# Patient Record
Sex: FEMALE | Race: ASIAN | NOT HISPANIC OR LATINO | URBAN - METROPOLITAN AREA
[De-identification: names, ages, dates, MRNs, and addresses within clinical notes are randomized per-mention and may not be internally consistent; named-entity substitution may affect disease eponyms.]

---

## 2020-12-21 ENCOUNTER — EMERGENCY (EMERGENCY)
Facility: HOSPITAL | Age: 65
LOS: 1 days | Discharge: HOME | End: 2020-12-21
Attending: EMERGENCY MEDICINE | Admitting: EMERGENCY MEDICINE
Payer: COMMERCIAL

## 2020-12-21 VITALS
SYSTOLIC BLOOD PRESSURE: 150 MMHG | RESPIRATION RATE: 18 BRPM | HEART RATE: 88 BPM | TEMPERATURE: 98 F | DIASTOLIC BLOOD PRESSURE: 78 MMHG | OXYGEN SATURATION: 99 %

## 2020-12-21 VITALS
OXYGEN SATURATION: 100 % | TEMPERATURE: 98 F | SYSTOLIC BLOOD PRESSURE: 137 MMHG | RESPIRATION RATE: 16 BRPM | HEART RATE: 63 BPM | DIASTOLIC BLOOD PRESSURE: 77 MMHG

## 2020-12-21 LAB
ALBUMIN SERPL ELPH-MCNC: 4.3 G/DL — SIGNIFICANT CHANGE UP (ref 3.3–5)
ALP SERPL-CCNC: 82 U/L — SIGNIFICANT CHANGE UP (ref 40–120)
ALT FLD-CCNC: 35 U/L — HIGH (ref 4–33)
ANION GAP SERPL CALC-SCNC: 9 MMOL/L — SIGNIFICANT CHANGE UP (ref 7–14)
APTT BLD: 33.4 SEC — SIGNIFICANT CHANGE UP (ref 27–36.3)
AST SERPL-CCNC: 29 U/L — SIGNIFICANT CHANGE UP (ref 4–32)
BASOPHILS # BLD AUTO: 0.04 K/UL — SIGNIFICANT CHANGE UP (ref 0–0.2)
BASOPHILS NFR BLD AUTO: 0.5 % — SIGNIFICANT CHANGE UP (ref 0–2)
BILIRUB SERPL-MCNC: 0.2 MG/DL — SIGNIFICANT CHANGE UP (ref 0.2–1.2)
BUN SERPL-MCNC: 14 MG/DL — SIGNIFICANT CHANGE UP (ref 7–23)
CALCIUM SERPL-MCNC: 10.1 MG/DL — SIGNIFICANT CHANGE UP (ref 8.4–10.5)
CHLORIDE SERPL-SCNC: 104 MMOL/L — SIGNIFICANT CHANGE UP (ref 98–107)
CO2 SERPL-SCNC: 29 MMOL/L — SIGNIFICANT CHANGE UP (ref 22–31)
CREAT SERPL-MCNC: 0.74 MG/DL — SIGNIFICANT CHANGE UP (ref 0.5–1.3)
EOSINOPHIL # BLD AUTO: 0.11 K/UL — SIGNIFICANT CHANGE UP (ref 0–0.5)
EOSINOPHIL NFR BLD AUTO: 1.4 % — SIGNIFICANT CHANGE UP (ref 0–6)
GLUCOSE SERPL-MCNC: 91 MG/DL — SIGNIFICANT CHANGE UP (ref 70–99)
HCT VFR BLD CALC: 41.7 % — SIGNIFICANT CHANGE UP (ref 34.5–45)
HGB BLD-MCNC: 12.2 G/DL — SIGNIFICANT CHANGE UP (ref 11.5–15.5)
IANC: 4.75 K/UL — SIGNIFICANT CHANGE UP (ref 1.5–8.5)
IMM GRANULOCYTES NFR BLD AUTO: 0.4 % — SIGNIFICANT CHANGE UP (ref 0–1.5)
INR BLD: 1.05 RATIO — SIGNIFICANT CHANGE UP (ref 0.88–1.17)
LYMPHOCYTES # BLD AUTO: 2.32 K/UL — SIGNIFICANT CHANGE UP (ref 1–3.3)
LYMPHOCYTES # BLD AUTO: 30.2 % — SIGNIFICANT CHANGE UP (ref 13–44)
MCHC RBC-ENTMCNC: 21.5 PG — LOW (ref 27–34)
MCHC RBC-ENTMCNC: 29.3 GM/DL — LOW (ref 32–36)
MCV RBC AUTO: 73.4 FL — LOW (ref 80–100)
MONOCYTES # BLD AUTO: 0.44 K/UL — SIGNIFICANT CHANGE UP (ref 0–0.9)
MONOCYTES NFR BLD AUTO: 5.7 % — SIGNIFICANT CHANGE UP (ref 2–14)
NEUTROPHILS # BLD AUTO: 4.75 K/UL — SIGNIFICANT CHANGE UP (ref 1.8–7.4)
NEUTROPHILS NFR BLD AUTO: 61.8 % — SIGNIFICANT CHANGE UP (ref 43–77)
NRBC # BLD: 0 /100 WBCS — SIGNIFICANT CHANGE UP
NRBC # FLD: 0 K/UL — SIGNIFICANT CHANGE UP
PLATELET # BLD AUTO: 310 K/UL — SIGNIFICANT CHANGE UP (ref 150–400)
POTASSIUM SERPL-MCNC: 4 MMOL/L — SIGNIFICANT CHANGE UP (ref 3.5–5.3)
POTASSIUM SERPL-SCNC: 4 MMOL/L — SIGNIFICANT CHANGE UP (ref 3.5–5.3)
PROT SERPL-MCNC: 8.3 G/DL — SIGNIFICANT CHANGE UP (ref 6–8.3)
PROTHROM AB SERPL-ACNC: 11.9 SEC — SIGNIFICANT CHANGE UP (ref 9.8–13.1)
RBC # BLD: 5.68 M/UL — HIGH (ref 3.8–5.2)
RBC # FLD: 15.7 % — HIGH (ref 10.3–14.5)
SODIUM SERPL-SCNC: 142 MMOL/L — SIGNIFICANT CHANGE UP (ref 135–145)
TROPONIN T, HIGH SENSITIVITY RESULT: 10 NG/L — SIGNIFICANT CHANGE UP
TROPONIN T, HIGH SENSITIVITY RESULT: 12 NG/L — SIGNIFICANT CHANGE UP
WBC # BLD: 7.69 K/UL — SIGNIFICANT CHANGE UP (ref 3.8–10.5)
WBC # FLD AUTO: 7.69 K/UL — SIGNIFICANT CHANGE UP (ref 3.8–10.5)

## 2020-12-21 PROCEDURE — 70498 CT ANGIOGRAPHY NECK: CPT | Mod: 26

## 2020-12-21 PROCEDURE — 99284 EMERGENCY DEPT VISIT MOD MDM: CPT

## 2020-12-21 PROCEDURE — 70496 CT ANGIOGRAPHY HEAD: CPT | Mod: 26

## 2020-12-21 RX ORDER — MECLIZINE HCL 12.5 MG
25 TABLET ORAL ONCE
Refills: 0 | Status: COMPLETED | OUTPATIENT
Start: 2020-12-21 | End: 2020-12-21

## 2020-12-21 RX ORDER — METOCLOPRAMIDE HCL 10 MG
10 TABLET ORAL ONCE
Refills: 0 | Status: COMPLETED | OUTPATIENT
Start: 2020-12-21 | End: 2020-12-21

## 2020-12-21 RX ADMIN — Medication 25 MILLIGRAM(S): at 14:18

## 2020-12-21 RX ADMIN — Medication 10 MILLIGRAM(S): at 14:18

## 2020-12-21 NOTE — ED PROVIDER NOTE - ATTENDING CONTRIBUTION TO CARE
65F h/o htn, woke up this morning with sense of imbalance (drifting to left side), fell x 2.  called as code stroke, cleared by neuro, also possibly presyncopal, will check cardiacs, telemetry while in the department, will refer to outpt cards/ neuro.   GEN - NAD; well appearing; A+O x3   HEAD - NC/AT     EYES - EOMI, no conjunctival pallor, no scleral icterus  ENT -   mucous membranes  moist , no discharge      NECK - Neck supple  PULM - CTA b/l,  symmetric breath sounds  COR -  RRR, S1 S2, no murmurs  ABD - , ND, NT, soft, no guarding, no rebound, no masses    BACK - no CVA tenderness, nontender spine     EXTREMS - no edema, no deformity, warm and well perfused    SKIN - no rash or bruising      NEUROLOGIC -  A&Ox3, PERRLA, EOMI, no nystagmus, CN2-12 grossly intact, no pronator drift, normal finger to nose and rapid alternating finger movements, normal sensation and 5/5 strength in all extremities

## 2020-12-21 NOTE — ED PROVIDER NOTE - NSFOLLOWUPINSTRUCTIONS_ED_ALL_ED_FT
Near Syncope    WHAT YOU NEED TO KNOW:    Near syncope, also called presyncope, is the feeling that you may faint (lose consciousness), but you do not. You can control some health conditions that cause near syncope. Your healthcare providers can help you create a plan to manage near syncope and prevent episodes.    DISCHARGE INSTRUCTIONS:    Return to the emergency department if:    You have sudden chest pain.    You have trouble breathing or shortness of breath.    You have vision changes, are sweating, and have nausea while you are sitting or lying down.    You feel dizzy or flushed and your heart is fluttering.    You lose consciousness.    You cannot use your arm, hand, foot, or leg, or it feels weak.    You have trouble speaking or understanding others when they speak.  Contact your healthcare provider if:    You have new or worsening symptoms.    Your heart beats faster or slower than usual.    You have questions or concerns about your condition or care.  Medicines:    Medicines may be needed to help your heart pump strongly and regularly. Your healthcare provider may also make changes to any medicines that are causing syncope.    Take your medicine as directed. Contact your healthcare provider if you think your medicine is not helping or if you have side effects. Tell him or her if you are allergic to any medicine. Keep a list of the medicines, vitamins, and herbs you take. Include the amounts, and when and why you take them. Bring the list or the pill bottles to follow-up visits. Carry your medicine list with you in case of an emergency.  Follow up with your healthcare provider as directed: You may need more tests to help find the cause of your near syncope episodes. The tests will help healthcare providers plan the best treatment for you. Write down your questions so you remember to ask them during your visits.    Manage near syncope:    Sit or lie down when needed. This includes when you feel dizzy, your throat is getting tight, and your vision changes. Raise your legs above the level of your heart.    Take slow, deep breaths if you start to breathe faster with anxiety or fear. This can help decrease dizziness and the feeling that you might faint.    Keep a record of your near syncope episodes. Include your symptoms and your activity before and after the episode. The record can help your healthcare provider find the cause of your near syncope and help you manage episodes.  Prevent a near syncope episode:    Move slowly and let yourself get used to one position before you move to another position. This is very important when you change from a lying or sitting position to a standing position. Take some deep breaths before you stand up from a lying position. Stand up slowly. Sudden movements may cause a fainting spell. Sit on the side of the bed or couch for a few minutes before you stand up. If you are on bedrest, try to be upright for about 2 hours each day, or as directed. Do not lock your legs if you are standing for a long period of time. Move your legs and bend your knees to keep blood flowing.    Follow your healthcare provider's recommendations. Your provider may recommend that you drink more liquids to prevent dehydration. You may also need to have more salt to keep your blood pressure from dropping too low and causing syncope. Your provider will tell you how much liquid and sodium to have each day. He or she will also tell you how much physical activity is safe for you. This will depend on what is causing your near syncope.    Watch for signs of low blood sugar. These include hunger, nervousness, sweating, and fast or fluttery heartbeats. Talk with your healthcare provider about ways to keep your blood sugar level steady.    Check your blood pressure often. This is important if you take medicine to lower your blood pressure. Check your blood pressure when you are lying down and when you are standing. Ask how often to check during the day. Keep a record of your blood pressure numbers. Your healthcare provider may use the record to help plan your treatment.  How to take a Blood Pressure      Do not strain if you are constipated. You may faint if you strain to have a bowel movement. Walking is the best way to get your bowels moving. Eat foods high in fiber to make it easier to have a bowel movement. Good examples are high-fiber cereals, beans, vegetables, and whole-grain breads. Prune juice may help make bowel movements softer.    Do not exercise outside on a hot day. The combination of physical activity and heat can lead to dehydration. This can cause syncope.

## 2020-12-21 NOTE — CONSULT NOTE ADULT - SUBJECTIVE AND OBJECTIVE BOX
SHARI [id number] is a 65y old  Female who presents with a chief complaint of     HPI: 64 yo F with PMH of HTN presents to the ED with acute onset dizziness. LKW last night. She reports that this morning, when she woke up at 5:30 am, she woke up with dizziness and sensation of falling down. Also with a severe headache. No diplopia, dysarthria, numbness, weakness.           MEDICATIONS  (STANDING):    MEDICATIONS  (PRN):    PAST MEDICAL & SURGICAL HISTORY:    FAMILY HISTORY:    Allergies    No Known Allergies    Intolerances        SHx - No smoking, No ETOH, No drug abuse      Review of Systems:    as above      Vital Signs Last 24 Hrs  T(C): --  T(F): --  HR: --  BP: --  BP(mean): --  RR: --  SpO2: --    Neurological Exam:  Mental Status: Orientated to self, date and place.  Attention intact.  No dysarthria. Speech fluent.  Cranial Nerves:  EOMI, VFF, no nystagmus.  No facial asymmetry.    Strength:   no drift in all 4 extremities             Dysmetria: None to finger-nose-finger or heel-shin-heel  Tremor: tremulous b/l, worse with arm extension  Sensation: intact to light touch. no extinction  Gait: normal. SAHRI [id number] is a 65y old  Female who presents with a chief complaint of     HPI: 66 yo F with PMH of HTN presents to the ED with acute onset dizziness. LKW last night. She reports that this morning, when she woke up at 5:30 am, she woke up with dizziness and sensation of falling down. Denies room spinning. Also with a severe headache. No diplopia, dysarthria, numbness, weakness.     MEDICATIONS  (STANDING):    MEDICATIONS  (PRN):    PAST MEDICAL & SURGICAL HISTORY:    FAMILY HISTORY:    Allergies    No Known Allergies    Intolerances        SHx - No smoking, No ETOH, No drug abuse      Review of Systems:    as above      Vital Signs Last 24 Hrs  T(C): --  T(F): --  HR: --  BP: --  BP(mean): --  RR: --  SpO2: --    Neurological Exam:  Mental Status: Orientated to self, date and place.  Attention intact.  No dysarthria. Speech fluent.  Cranial Nerves:  EOMI, VFF, no nystagmus.  No facial asymmetry.    Strength:   no drift in all 4 extremities             Dysmetria: None to finger-nose-finger or heel-shin-heel  Tremor: tremulous b/l, worse with arm extension  Sensation: intact to light touch. no extinction  Gait: normal.    nihss 0  mrs 0 SHARI [id number] is a 65y old  Female who presents with a chief complaint of     HPI: 66 yo F with PMH of HTN presents to the ED with acute onset dizziness. LKW last night. She reports that this morning, when she woke up at 5:30 am, she woke up with dizziness and sensation of falling down. Endorses feeling lightheadedness, worse when getting out of bed. Denies room spinning. Also with a severe right sided headache. No diplopia, dysarthria, numbness, weakness. She reports that she had a similar episode earlier this year.    MEDICATIONS  (STANDING):    MEDICATIONS  (PRN):    PAST MEDICAL & SURGICAL HISTORY:    FAMILY HISTORY:    Allergies    No Known Allergies    Intolerances        SHx - No smoking, No ETOH, No drug abuse      Review of Systems:    as above      Vital Signs Last 24 Hrs  T(C): --  T(F): --  HR: --  BP: --  BP(mean): --  RR: --  SpO2: --    Neurological Exam:  Mental Status: Orientated to self, date and place.  Attention intact.  No dysarthria. Speech fluent.  Cranial Nerves:  EOMI, VFF, no nystagmus.  No facial asymmetry.    Strength:   no drift in all 4 extremities             Dysmetria: None to finger-nose-finger or heel-shin-heel  Tremor: tremulous b/l, worse with arm extension  Sensation: intact to light touch. no extinction  Gait: normal.    nihss 0  mrs 0 SHARI [id number] is a 65y old  Female who presents with a chief complaint of     HPI: 64 yo F with PMH of HTN presents to the ED with acute onset dizziness. LKW last night. She reports that this morning, when she woke up at 5:30 am, she woke up with dizziness and sensation of falling down. Endorses feeling lightheadedness, worse when getting out of bed. Denies room spinning. Also with a severe right sided headache. No diplopia, dysarthria, numbness, weakness. She reports that she had a similar episode earlier this year.    MEDICATIONS  (STANDING):    MEDICATIONS  (PRN):    PAST MEDICAL & SURGICAL HISTORY:    FAMILY HISTORY:    Allergies    No Known Allergies    Intolerances        SHx - No smoking, No ETOH, No drug abuse      Review of Systems:    as above      Vital Signs Last 24 Hrs  T(C): --  T(F): --  HR: --  BP: --  BP(mean): --  RR: --  SpO2: --    Neurological Exam:  Mental Status: Orientated to self, date and place.  Attention intact.  No dysarthria. Speech fluent.  Cranial Nerves:  EOMI, VFF, no nystagmus.  No facial asymmetry.    Strength:   no drift in all 4 extremities             Dysmetria: None to finger-nose-finger or heel-shin-heel  Tremor: tremulous b/l, worse with arm extension  Sensation: intact to light touch. no extinction  Gait: normal.    no corrective saccade on head impulse test  no skew deviation    nihss 0  mrs 0 SHARI [id number] is a 65y old  Female who presents with a chief complaint of     HPI: 66 yo F with PMH of HTN, HLD presents to the ED with acute onset dizziness. LKW last night. She reports that this morning, when she woke up at 5:30 am, she woke up with dizziness and sensation of falling down. Endorses feeling lightheadedness, worse when getting out of bed. Denies room spinning. Also with a severe right sided headache. No diplopia, dysarthria, numbness, weakness. She reports that she had a similar episode earlier this year.    MEDICATIONS  (STANDING):    MEDICATIONS  (PRN):    PAST MEDICAL & SURGICAL HISTORY:    FAMILY HISTORY:    Allergies    No Known Allergies    Intolerances        SHx - No smoking, No ETOH, No drug abuse      Review of Systems:    as above      Vital Signs Last 24 Hrs  T(C): --  T(F): --  HR: --  BP: --  BP(mean): --  RR: --  SpO2: --    Neurological Exam:  Mental Status: Orientated to self, date and place.  Attention intact.  No dysarthria. Speech fluent.  Cranial Nerves:  EOMI, VFF, no nystagmus.  No facial asymmetry.    Strength:   no drift in all 4 extremities             Dysmetria: None to finger-nose-finger or heel-shin-heel  Tremor: tremulous b/l, worse with arm extension  Sensation: intact to light touch. no extinction  Gait: normal.    no corrective saccade on head impulse test  no skew deviation    nihss 0  mrs 0

## 2020-12-21 NOTE — ED PROVIDER NOTE - RAPID ASSESSMENT
Enoch:  called to triage to assess for possible stroke.  65F h/o htn, woke up this morning with sense of imbalance (drifting to left side), fell x 2.  Stroke code called, pt endorsed to neurology and Dr. Torres.

## 2020-12-21 NOTE — CONSULT NOTE ADULT - ASSESSMENT
64 yo F with PMH of HTN presents to the ED with acute onset dizziness. Neurological exam does not reveal any focal neurological deficits. CT head reveals -. CTA h/n reveals -.    Impression:     Plan: 64 yo F with PMH of HTN presents to the ED with acute onset dizziness. Neurological exam does not reveal any focal neurological deficits. CT head reveals no acute lesion. CTA h/n reveals -.    Impression: This is most likely pre-syncope or cardiac in origin. Less likely this is vertigo, and less likely symptoms could be explained by central etiology (ie stroke)    Plan:  -outpatient neurology follow up  -TTE and cardiology evaluation  -orthostatic BPs 66 yo F with PMH of HTN presents to the ED with acute onset dizziness. Neurological exam does not reveal any focal neurological deficits. CT head reveals no acute lesion. CTA h/n reveals no LVO or stenosis.    Impression: This is most likely pre-syncope or cardiac in origin. Less likely this is vertigo, and less likely symptoms could be explained by central etiology (ie stroke)    Plan:  -outpatient neurology follow up  -TTE and cardiology evaluation  -orthostatic BPs 64 yo F with PMH of HTN presents to the ED with acute onset dizziness. Neurological exam does not reveal any focal neurological deficits. CT head reveals no acute lesion. CTA h/n reveals no LVO or stenosis.    Impression: This is most likely pre-syncope or cardiac in origin. Less likely this is vertigo, and less likely symptoms could be explained by central etiology (ie stroke).    Plan:  -outpatient neurology follow up  -TTE and cardiology evaluation  -orthostatic BPs 64 yo F with PMH of HTN presents to the ED with acute onset dizziness. Neurological exam does not reveal any focal neurological deficits. CT head reveals no acute lesion. CTA h/n reveals no LVO or stenosis.    Impression: This is most likely pre-syncope or cardiac in origin. Less likely this is vertigo, and less likely symptoms could be explained by central etiology (ie stroke).    Plan:  -outpatient neurology follow up  -TTE and cardiology evaluation  -orthostatic BPs    case d/w Dr. Sy De La O, stroke fellow

## 2020-12-21 NOTE — ED ADULT TRIAGE NOTE - CHIEF COMPLAINT QUOTE
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 Pts symptoms began when she got up to go to work--pt fell on the way--poor coordination  Pt states the only other symptom is headache and dizziness  Dr Kendall evaluated and code stroke called

## 2020-12-21 NOTE — ED PROVIDER NOTE - PATIENT PORTAL LINK FT
You can access the FollowMyHealth Patient Portal offered by [facility] by registering at the following website: [email or web address]. By joining [employer]’s FollowMyHealth portal, you will also be able to view your health information using other applications (apps) compatible with our system.

## 2020-12-21 NOTE — ED ADULT NURSE NOTE - OBJECTIVE STATEMENT
pt is A&Ox3, ambulatory, able to make needs known and presents with C/O lightheadedness with HA and dizziness plus a feeling like "I'm pulling to the left when walking" since almost falling twice this morning at her bedside at 5am. PT has no neuro or muscular deficits/weakness. Denies any N/V/D or fever and chest pain

## 2020-12-21 NOTE — ED PROVIDER NOTE - NS ED ROS FT
CONSTITUTIONAL: No fever,  EYES: No redness  ENT: no sore throat  CARDIOVASCULAR: No chest pain,  RESPIRATORY: No cough, no shortness of breath  GI: No abdominal pain, no nausea, no vomiting,  GENITOURINARY: No dysuria  MUSKULOSKELETAL: No new pain in joints/muscles  SKIN: No rash  NEURO: ++++ headache  ALL OTHER SYSTEMS NEGATIVE.

## 2020-12-21 NOTE — ED PROVIDER NOTE - PHYSICAL EXAMINATION
CONSTITUTIONAL: Non-toxic, non-diaphoretic, in no apparent distress  HEAD: Normocephalic; atruamatic  EYES: EOM intact   ENMT: External appears normal; normal oropharynx, moist  NECK: grossly normal active ROM,  CARD: No cyanosis, good peripheral perfusion, RRR  RESP: Normal chest excursion with respiration; no increased work of breathing  ABD: non-distended   EXT: moving all extremities, no gross disfigurement or asymmetry,  SKIN: Warm, dry, no rash  NEURO:  moving all extremities, no facial droop, no dysarthria      cn2-12 intact   5/5 in rue, lue, rle, lle  no sensory deficit, no dysmetria

## 2020-12-21 NOTE — ED PROVIDER NOTE - CLINICAL SUMMARY MEDICAL DECISION MAKING FREE TEXT BOX
cleared by neuro, trending troponin but no cardiac symptoms, can likely complete workup as an outpatient, of note was out of window for t-PA and nihss too low cleared by neuro, trending troponin but no cardiac symptoms, can likely complete workup as an outpatient, of note was out of window for t-PA.

## 2020-12-21 NOTE — ED PROVIDER NOTE - OBJECTIVE STATEMENT
65 y f pmh htn, dm  pw lightheadedness and severe10/10 headache  started this morning at 0530, it woke her up  last normal was when she went to sleep  no room spinning sensation  no chest pain, or other symptoms  says the left side of her upper harm has some paraethesias

## 2021-03-04 ENCOUNTER — EMERGENCY (EMERGENCY)
Facility: HOSPITAL | Age: 66
LOS: 1 days | Discharge: HOME | End: 2021-03-04
Admitting: EMERGENCY MEDICINE
Payer: COMMERCIAL

## 2021-03-04 VITALS
HEART RATE: 77 BPM | OXYGEN SATURATION: 100 % | TEMPERATURE: 98 F | SYSTOLIC BLOOD PRESSURE: 122 MMHG | RESPIRATION RATE: 16 BRPM | DIASTOLIC BLOOD PRESSURE: 66 MMHG

## 2021-03-04 VITALS
OXYGEN SATURATION: 100 % | RESPIRATION RATE: 18 BRPM | DIASTOLIC BLOOD PRESSURE: 77 MMHG | SYSTOLIC BLOOD PRESSURE: 171 MMHG | HEART RATE: 72 BPM | TEMPERATURE: 98 F

## 2021-03-04 PROCEDURE — 99284 EMERGENCY DEPT VISIT MOD MDM: CPT

## 2021-03-04 PROCEDURE — 73030 X-RAY EXAM OF SHOULDER: CPT | Mod: 26,RT

## 2021-03-04 PROCEDURE — 73060 X-RAY EXAM OF HUMERUS: CPT | Mod: 26,RT

## 2021-03-04 PROCEDURE — 73080 X-RAY EXAM OF ELBOW: CPT | Mod: 26,RT

## 2021-03-04 RX ORDER — DIAZEPAM 5 MG
5 TABLET ORAL ONCE
Refills: 0 | Status: DISCONTINUED | OUTPATIENT
Start: 2021-03-04 | End: 2021-03-04

## 2021-03-04 RX ORDER — ACETAMINOPHEN 500 MG
975 TABLET ORAL ONCE
Refills: 0 | Status: COMPLETED | OUTPATIENT
Start: 2021-03-04 | End: 2021-03-04

## 2021-03-04 RX ADMIN — Medication 5 MILLIGRAM(S): at 22:48

## 2021-03-04 RX ADMIN — Medication 50 MILLIGRAM(S): at 22:48

## 2021-03-04 RX ADMIN — Medication 975 MILLIGRAM(S): at 22:48

## 2021-03-04 NOTE — ED ADULT NURSE NOTE - OBJECTIVE STATEMENT
patient came to Er with complaints of right arm pain  from shoulder to elbow since 2 days. alert and oriented

## 2021-03-04 NOTE — ED ADULT TRIAGE NOTE - CHIEF COMPLAINT QUOTE
Pt was at work was assisting pt to a chair x 2 days ago.  pt co severe pain from elbow to right shoulder. pt took naprosen with no relief

## 2021-03-05 RX ORDER — LIDOCAINE 4 G/100G
1 CREAM TOPICAL ONCE
Refills: 0 | Status: COMPLETED | OUTPATIENT
Start: 2021-03-05 | End: 2021-03-05

## 2021-03-05 RX ORDER — DIAZEPAM 5 MG
1 TABLET ORAL
Qty: 9 | Refills: 0
Start: 2021-03-05 | End: 2021-03-07

## 2021-03-05 RX ADMIN — LIDOCAINE 1 PATCH: 4 CREAM TOPICAL at 00:54

## 2021-03-05 NOTE — ED PROVIDER NOTE - PHYSICAL EXAMINATION
Vital signs reviewed.   CONSTITUTIONAL:  in no apparent distress. Non-toxic appearing.   HEAD: Normocephalic, atraumatic.  EYES: PERRL, EOM intact, conjunctiva and sclera WNL.  ENT: normal nose; no rhinorrhea; normal pharynx with no tonsillar hypertrophy, no erythema, no exudate, no lymphadenopathy.  NECK/LYMPH: Supple; non-tender; no cervical lymphadenopathy.  CARD: Normal S1, S2; no murmurs, rubs, or gallops noted.  RESP: Normal chest excursion with respiration; breath sounds clear and equal bilaterally; no wheezes, rhonchi, or rales.  EXT/MS:  +TTP noted over humeral region over bicep and tricep. Mild Rt trapezius tenderness. No ttp over elbow  or shoulder joint space. No crepitus. Compartments soft. No signs of erythema, swelling, ecchymosis noted. LROM of Rt arm due to pain. Normal  strength and normal sensation throughout. Pt moves all other extremities; distal pulses are normal  SKIN: Normal for age and race; warm; dry; good turgor; no apparent lesions or exudate noted.  NEURO: Awake, alert, oriented x 3, no gross deficits, no sensory deficit noted.  PSYCH: Normal mood; appropriate affect.

## 2021-03-05 NOTE — ED PROVIDER NOTE - PROGRESS NOTE DETAILS
PA Saldivar- Patient reassess. Sx improved. Improved ROM s/p medications. Results of xrays reviewed and discussed with patient. No acute fx/dislocation noted. Pt vitals stable. Will plan for DC home on medrol dose pack and muscle relaxer. Pt given ortho referral. All questions and concerns addressed. Strict return instructions given.

## 2021-03-05 NOTE — ED PROVIDER NOTE - CLINICAL SUMMARY MEDICAL DECISION MAKING FREE TEXT BOX
Patient is a 65 y.o female with PMHx of HTN, HLD who presents to ED c/o Rt humeral pain intermittent x 6 days. +ttp. No sensory deficits. Compartments soft. No signs of infection. Pt sx appear muscular. Will obtain xrays and give medications at this time.

## 2021-03-05 NOTE — ED PROVIDER NOTE - OBJECTIVE STATEMENT
HPI- Patient is a 65 y.o female with PMHx of HTN, HLD who presents to ED c/o Rt humeral pain intermittent x 6 days. 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 Initially had mild pain over humeral region but then notes that pain increased and became more severe. Sx exacerbated with change in position including lifting arm. Pt went to PCP today and was given naproxen with little relief. Pt denies numbness or tingling, weakness, headaches, neck pain, back pain, cp, sob, dizziness, n/v/d, abd pain, fevers, chills, recent injections, swelling, redness or any other complaints. Pain extends from elbow to shoulder. Pt Rt hand dominant.

## 2021-03-05 NOTE — ED PROVIDER NOTE - NSFOLLOWUPINSTRUCTIONS_ED_ALL_ED_FT
Patient advised to follow up with PRIMARY CARE DOCTOR WITHIN 72 HOURS AND ORTHOPEDIST WITHIN WEEK   and told to return to the emergency department immediately for any new or concerning symptoms such as WORSENING PAIN, CHEST PAIN, SHORTNESS OF BREATH OR ANY OTHER COMPLAINTS. Patient agrees with plan.    Rest, avoid strenuous activity/heavy lifting  Take muscle relaxer as directed. No drinking alcohol or driving if taking. Can cause drowsiness.   Continue your naproxen or take Ibuprofen 600 mg every 6 hours as needed for pain   Take medrol dose pack as directed.     Continue all previously prescribed medications as directed unless otherwise instructed.  Follow up with your primary care physician in 48-72 hours- bring copies of your results.  Return to the ER for worsening or persistent symptoms, and/or ANY NEW OR CONCERNING SYMPTOMS. If you have issues obtaining follow up, please call: [phone] to obtain a doctor or specialist who takes your insurance in your area.  You may call [phone] to make an appointment with the internal medicine clinic.

## 2021-11-27 NOTE — ED PROVIDER NOTE - NSDCPRINTRESULTS_ED_ALL_ED
patient stated/observation/history of breastfeeding difficulty/infant  from mother
Patient requests all Lab and Radiology Results on their Discharge Instructions

## 2022-03-30 ENCOUNTER — EMERGENCY (EMERGENCY)
Facility: HOSPITAL | Age: 67
LOS: 1 days | Discharge: HOME | End: 2022-03-30
Attending: STUDENT IN AN ORGANIZED HEALTH CARE EDUCATION/TRAINING PROGRAM | Admitting: STUDENT IN AN ORGANIZED HEALTH CARE EDUCATION/TRAINING PROGRAM
Payer: COMMERCIAL

## 2022-03-30 VITALS
SYSTOLIC BLOOD PRESSURE: 133 MMHG | DIASTOLIC BLOOD PRESSURE: 60 MMHG | RESPIRATION RATE: 20 BRPM | TEMPERATURE: 98 F | OXYGEN SATURATION: 98 % | HEART RATE: 85 BPM

## 2022-03-30 VITALS
DIASTOLIC BLOOD PRESSURE: 86 MMHG | HEART RATE: 88 BPM | OXYGEN SATURATION: 100 % | SYSTOLIC BLOOD PRESSURE: 111 MMHG | RESPIRATION RATE: 18 BRPM

## 2022-03-30 PROBLEM — Z00.00 ENCOUNTER FOR PREVENTIVE HEALTH EXAMINATION: Status: ACTIVE | Noted: 2022-03-30

## 2022-03-30 LAB
ALBUMIN SERPL ELPH-MCNC: 4.1 G/DL — SIGNIFICANT CHANGE UP (ref 3.3–5)
ALP SERPL-CCNC: 69 U/L — SIGNIFICANT CHANGE UP (ref 40–120)
ALT FLD-CCNC: 30 U/L — SIGNIFICANT CHANGE UP (ref 4–33)
ANION GAP SERPL CALC-SCNC: 11 MMOL/L — SIGNIFICANT CHANGE UP (ref 7–14)
APPEARANCE UR: CLEAR — SIGNIFICANT CHANGE UP
AST SERPL-CCNC: 27 U/L — SIGNIFICANT CHANGE UP (ref 4–32)
BACTERIA # UR AUTO: NEGATIVE — SIGNIFICANT CHANGE UP
BASOPHILS # BLD AUTO: 0.05 K/UL — SIGNIFICANT CHANGE UP (ref 0–0.2)
BASOPHILS NFR BLD AUTO: 0.6 % — SIGNIFICANT CHANGE UP (ref 0–2)
BILIRUB SERPL-MCNC: 0.3 MG/DL — SIGNIFICANT CHANGE UP (ref 0.2–1.2)
BILIRUB UR-MCNC: NEGATIVE — SIGNIFICANT CHANGE UP
BUN SERPL-MCNC: 16 MG/DL — SIGNIFICANT CHANGE UP (ref 7–23)
CALCIUM SERPL-MCNC: 9.6 MG/DL — SIGNIFICANT CHANGE UP (ref 8.4–10.5)
CHLORIDE SERPL-SCNC: 101 MMOL/L — SIGNIFICANT CHANGE UP (ref 98–107)
CO2 SERPL-SCNC: 27 MMOL/L — SIGNIFICANT CHANGE UP (ref 22–31)
COLOR SPEC: SIGNIFICANT CHANGE UP
CREAT SERPL-MCNC: 0.79 MG/DL — SIGNIFICANT CHANGE UP (ref 0.5–1.3)
DIFF PNL FLD: NEGATIVE — SIGNIFICANT CHANGE UP
EGFR: 82 ML/MIN/1.73M2 — SIGNIFICANT CHANGE UP
EOSINOPHIL # BLD AUTO: 0.39 K/UL — SIGNIFICANT CHANGE UP (ref 0–0.5)
EOSINOPHIL NFR BLD AUTO: 4.6 % — SIGNIFICANT CHANGE UP (ref 0–6)
EPI CELLS # UR: 2 /HPF — SIGNIFICANT CHANGE UP (ref 0–5)
GLUCOSE SERPL-MCNC: 100 MG/DL — HIGH (ref 70–99)
GLUCOSE UR QL: NEGATIVE — SIGNIFICANT CHANGE UP
HCT VFR BLD CALC: 37.2 % — SIGNIFICANT CHANGE UP (ref 34.5–45)
HGB BLD-MCNC: 11.4 G/DL — LOW (ref 11.5–15.5)
HYALINE CASTS # UR AUTO: 0 /LPF — SIGNIFICANT CHANGE UP (ref 0–7)
IANC: 4.78 K/UL — SIGNIFICANT CHANGE UP (ref 1.8–7.4)
IMM GRANULOCYTES NFR BLD AUTO: 0.4 % — SIGNIFICANT CHANGE UP (ref 0–1.5)
KETONES UR-MCNC: NEGATIVE — SIGNIFICANT CHANGE UP
LEUKOCYTE ESTERASE UR-ACNC: ABNORMAL
LYMPHOCYTES # BLD AUTO: 2.6 K/UL — SIGNIFICANT CHANGE UP (ref 1–3.3)
LYMPHOCYTES # BLD AUTO: 30.6 % — SIGNIFICANT CHANGE UP (ref 13–44)
MCHC RBC-ENTMCNC: 21.8 PG — LOW (ref 27–34)
MCHC RBC-ENTMCNC: 30.6 GM/DL — LOW (ref 32–36)
MCV RBC AUTO: 71.3 FL — LOW (ref 80–100)
MONOCYTES # BLD AUTO: 0.65 K/UL — SIGNIFICANT CHANGE UP (ref 0–0.9)
MONOCYTES NFR BLD AUTO: 7.6 % — SIGNIFICANT CHANGE UP (ref 2–14)
NEUTROPHILS # BLD AUTO: 4.78 K/UL — SIGNIFICANT CHANGE UP (ref 1.8–7.4)
NEUTROPHILS NFR BLD AUTO: 56.2 % — SIGNIFICANT CHANGE UP (ref 43–77)
NITRITE UR-MCNC: NEGATIVE — SIGNIFICANT CHANGE UP
NRBC # BLD: 0 /100 WBCS — SIGNIFICANT CHANGE UP
NRBC # FLD: 0 K/UL — SIGNIFICANT CHANGE UP
PH UR: 6.5 — SIGNIFICANT CHANGE UP (ref 5–8)
PLATELET # BLD AUTO: 345 K/UL — SIGNIFICANT CHANGE UP (ref 150–400)
POTASSIUM SERPL-MCNC: 3.9 MMOL/L — SIGNIFICANT CHANGE UP (ref 3.5–5.3)
POTASSIUM SERPL-SCNC: 3.9 MMOL/L — SIGNIFICANT CHANGE UP (ref 3.5–5.3)
PROT SERPL-MCNC: 7.8 G/DL — SIGNIFICANT CHANGE UP (ref 6–8.3)
PROT UR-MCNC: NEGATIVE — SIGNIFICANT CHANGE UP
RBC # BLD: 5.22 M/UL — HIGH (ref 3.8–5.2)
RBC # FLD: 16.9 % — HIGH (ref 10.3–14.5)
RBC CASTS # UR COMP ASSIST: 1 /HPF — SIGNIFICANT CHANGE UP (ref 0–4)
SARS-COV-2 RNA SPEC QL NAA+PROBE: SIGNIFICANT CHANGE UP
SODIUM SERPL-SCNC: 139 MMOL/L — SIGNIFICANT CHANGE UP (ref 135–145)
SP GR SPEC: 1.01 — SIGNIFICANT CHANGE UP (ref 1–1.05)
UROBILINOGEN FLD QL: SIGNIFICANT CHANGE UP
WBC # BLD: 8.5 K/UL — SIGNIFICANT CHANGE UP (ref 3.8–10.5)
WBC # FLD AUTO: 8.5 K/UL — SIGNIFICANT CHANGE UP (ref 3.8–10.5)
WBC UR QL: 5 /HPF — SIGNIFICANT CHANGE UP (ref 0–5)

## 2022-03-30 PROCEDURE — 72100 X-RAY EXAM L-S SPINE 2/3 VWS: CPT | Mod: 26

## 2022-03-30 PROCEDURE — 71046 X-RAY EXAM CHEST 2 VIEWS: CPT | Mod: 26

## 2022-03-30 PROCEDURE — 99284 EMERGENCY DEPT VISIT MOD MDM: CPT

## 2022-03-30 RX ORDER — KETOROLAC TROMETHAMINE 30 MG/ML
15 SYRINGE (ML) INJECTION ONCE
Refills: 0 | Status: DISCONTINUED | OUTPATIENT
Start: 2022-03-30 | End: 2022-03-30

## 2022-03-30 RX ORDER — ACETAMINOPHEN 500 MG
650 TABLET ORAL ONCE
Refills: 0 | Status: COMPLETED | OUTPATIENT
Start: 2022-03-30 | End: 2022-03-30

## 2022-03-30 RX ORDER — TIZANIDINE 4 MG/1
4 TABLET ORAL ONCE
Refills: 0 | Status: COMPLETED | OUTPATIENT
Start: 2022-03-30 | End: 2022-03-30

## 2022-03-30 RX ORDER — SODIUM CHLORIDE 9 MG/ML
1000 INJECTION INTRAMUSCULAR; INTRAVENOUS; SUBCUTANEOUS
Refills: 0 | Status: DISCONTINUED | OUTPATIENT
Start: 2022-03-30 | End: 2022-04-02

## 2022-03-30 RX ORDER — LIDOCAINE 4 G/100G
1 CREAM TOPICAL ONCE
Refills: 0 | Status: COMPLETED | OUTPATIENT
Start: 2022-03-30 | End: 2022-03-30

## 2022-03-30 RX ADMIN — LIDOCAINE 1 PATCH: 4 CREAM TOPICAL at 10:38

## 2022-03-30 RX ADMIN — Medication 650 MILLIGRAM(S): at 10:41

## 2022-03-30 RX ADMIN — SODIUM CHLORIDE 1000 MILLILITER(S): 9 INJECTION INTRAMUSCULAR; INTRAVENOUS; SUBCUTANEOUS at 12:58

## 2022-03-30 RX ADMIN — TIZANIDINE 4 MILLIGRAM(S): 4 TABLET ORAL at 11:04

## 2022-03-30 RX ADMIN — Medication 15 MILLIGRAM(S): at 10:40

## 2022-03-30 NOTE — ED PROVIDER NOTE - OBJECTIVE STATEMENT
65 y/o female pmh htn, hld c/o L lower back pain x3 months, worse x 3 days. Pt states that she has had lower back pain ever since having covid in December. pt admits to l lower back pain that radiates down L leg, worse with movement and walking, relief with motrin. Pt admits to generalized weakness, chills, loss of taste and worsening pain over the last 3 days. pt took a covid test x 2 days. Pt denies chest pain, sob, abd pain, n/v, numbness, tingling, dizziness, syncope, bowel or bladder incontinence, or fever. Denies recent travel or sick contacts.

## 2022-03-30 NOTE — ED PROVIDER NOTE - NS ED ATTENDING STATEMENT MOD
This was a shared visit with the LEO. I reviewed and verified the documentation and independently performed the documented:

## 2022-03-30 NOTE — ED PROVIDER NOTE - CLINICAL SUMMARY MEDICAL DECISION MAKING FREE TEXT BOX
67 y/o female pmh htn, hld c/o low back pain x3 months worse x 3 days now w/ generalized weakness and chills- no neuro deficits, no red flags. will check labs, ua, cxr, covid swab, pain control, will need spine f/u and likely PT.

## 2022-03-30 NOTE — ED PROVIDER NOTE - PHYSICAL EXAMINATION
no midline tenderness, + L paraspinal tenderness in SI region. + straight leg raise on L side at 30 degrees.

## 2022-03-30 NOTE — ED ADULT NURSE NOTE - OBJECTIVE STATEMENT
pt A&Ox3 c/o lower back pain that has been occurring for the last 3 months that has worsened over the last few days. pt c/o lower back pain that radiates to the left lower extremity. pt c/o generalized weakness. pt ambulatory and skin in tact. pt denies falls/trauma. pt respirations even and unlabored with no accessory muscle use. pt denies chest pain, sob, nausea, vomiting, dizziness, headache. pt pmhx of HTN and HLD. md at bedside for eval. awaiting further orders at this time. pt A&Ox3 c/o lower back pain that has been occurring for the last 3 months that has worsened over the last few days. pt c/o lower back pain that radiates to the left lower extremity. pt c/o generalized weakness. pt ambulatory and skin in tact. pt denies falls/trauma. pt respirations even and unlabored with no accessory muscle use. pt denies chest pain, sob, nausea, vomiting, dizziness, headache. pt pmhx of HTN and HLD. md at bedside for eval. 20G to the LAC. labs collected and sent. awaiting further orders at this time.

## 2022-03-30 NOTE — ED ADULT TRIAGE NOTE - CHIEF COMPLAINT QUOTE
pt presents to ED for evaluation of lower back pain radiating down LLE, weakness and dizziness x 3 days. pmh of htn and hld. denies any recent trauma/injury

## 2022-03-30 NOTE — ED ADULT TRIAGE NOTE - BEFAST SCREENING
Chief Complaint   Patient presents with   • Follow-up     Medications       Narrative summary:   Hayley is a 59 year old female who is seen for evaluation of her anxiety and asthma and hyperlipidemia.  Patient was advised Symbicort sample inhalers  are unavailable today.  Patient requests a prescription for Symbicort along with Ativan.  She has 1 daily p.r.n..  Patient denies other health concerns today.    I have reviewed the patient's medications and allergies, past medical, surgical, social and family history, updating these as appropriate.  See Histories section of the EMR (electronic medical record) for a display of this information.     Patient Active Problem List   Diagnosis   • Anxiety disorder   • Unspecified asthma(493.90)   • Crohn's disease (CMS/[facility])   • Depressive disorder, not elsewhere classified   • Esophageal reflux   • Migraine   • Exacerbation of Crohn's disease (CMS/[facility])   • Leukocytosis   • Nausea and vomiting   • Alcohol dependence (CMS/[facility])   • Tobacco dependence   • Asthma   • Bilateral dry eyes   • History of contact dermatitis of eyelids 2/08   • Astigmatism of both eyes with presbyopia   • Allergic rhinitis        MEDICATIONS  Current Outpatient Medications   Medication Sig   • albuterol (Ventolin HFA) 108 (90 Base) MCG/ACT inhaler Inhale 2 puffs into the lungs every 4 hours as needed for Shortness of Breath or Wheezing.   • LORazepam (ATIVAN) 1 MG tablet Take 1 tablet by mouth daily as needed for Anxiety.   • budesonide-formoterol (Symbicort) 160-4.5 MCG/ACT inhaler Inhale 2 puffs into the lungs 2 times daily.   • fluticasone (FLONASE) 50 MCG/ACT nasal spray SHAKE LIQUID AND USE 2 SPRAYS IN EACH NOSTRIL DAILY   • primidone (MYSOLINE) 250 MG tablet 1 po q H/S   • primidone (MYSOLINE) 50 MG tablet Take 1 or 2 po q day   • ciprofloxacin (CIPRO) 0.3 % ophthalmic solution 2 drops each eye TID for 7 days   • atorvastatin (LIPITOR) 40 MG tablet Take 1 tablet by mouth daily.   • sertraline  (ZOLOFT) 100 MG tablet TAKE 1 TABLET BY MOUTH TWICE DAILY   • gabapentin (NEURONTIN) 300 MG capsule Take 1 cap by mouth at bedtime for 7 days, then increase to 2 caps by mouth at bedtime until seen in follow up.   • Magnesium 400 MG Cap Two daily   • albuterol (VENTOLIN) (2.5 MG/3ML) 0.083% nebulizer solution USE 3 ML VIA NEBULIZER EVERY 6 HOURS AS NEEDED FOR WHEEZING   • Bioflavonoid Products (SUPER-C 1000 PO)    • rabeprazole (ACIPHEX) 20 MG tablet Take 20 mg by mouth daily.   • ondansetron (ZOFRAN ODT) 4 MG disintegrating tablet Place 1 tablet onto the tongue every 8 hours as needed for Nausea.   • Cholecalciferol (VITAMIN D3) 2000 units capsule Take 2,000 Units by mouth.   • cetirizine (ZYRTEC) 10 MG tablet Take 10 mg by mouth.   • mupirocin (BACTROBAN) 2 % ointment Apply topically 3 times daily.   • EPINEPHrine (EPIPEN 2-AMOL) 0.3 MG/0.3ML auto-injector Inject 0.3 mLs into the muscle once as needed for Anaphylaxis.   • Adalimumab (HUMIRA SC)    • DISPENSE Dispense one MEDNEB nebulizer machine/ asthma 493.90   • acetaminophen (TYLENOL) 500 MG tablet Take 1,000 mg by mouth as needed.     No current facility-administered medications for this visit.        ALLERGIES  Allergies as of 12/29/2020 - Reviewed 12/29/2020   Allergen Reaction Noted   • Iodine   (environmental or med) ANAPHYLAXIS 08/06/2013   • Percocet [oxycodone-acetaminophen] Other (See Comments)    • Shellfish allergy   (food or med) ANAPHYLAXIS 08/06/2013   • Adhesive   (environmental) RASH 08/18/2016   • Bacitracin RASH 08/06/2013   • Codeine CARDIAC DISTURBANCES 08/06/2013   • Hydrocodone-pseudoephedrine [p-v tussin]  08/06/2013   • Morphine  08/06/2013   • Neomycin  08/06/2013   • Oxycodone CARDIAC DISTURBANCES 08/06/2013   • Penicillins RASH 08/06/2013   • Remicade [infliximab] CARDIAC DISTURBANCES 02/25/2016   • Tobradex  08/06/2013   • Tramadol  08/06/2013   • Vicodin [hydrocodone-acetaminophen]  08/06/2013   • Latex Other (See Comments)  2020       SOCIAL HISTORY  Social History     Tobacco Use   • Smoking status: Current Every Day Smoker     Packs/day: 0.01     Types: Cigarettes     Last attempt to quit: 2016     Years since quittin.4   • Smokeless tobacco: Never Used   • Tobacco comment: Patient resumed smoking - 5-6 per day 19, note, pt has 40 pk yr hx, has resumed smoking   Substance Use Topics   • Alcohol use: Yes     Alcohol/week: 5.0 standard drinks     Types: 5 Cans of beer per week     Frequency: 4 or more times a week     Drinks per session: 5 or 6     Binge frequency: Monthly     Comment: decreased   • Drug use: No     Comment: denies       Recent PHQ 2/9 Score    PHQ 2:  Date Adult PHQ 2 Score Adult PHQ 2 Interpretation   2020 0 No further screening needed       PHQ 9:  Date Adult PHQ 9 Score Adult PHQ 9 Interpretation   2019 15 Moderately Severe Depression       DEPRESSION ASSESSMENT/PLAN:  Depression screening is negative no further plan needed.       Review of Systems:  A review of systems in addition to that above remarkable reporting of:  Constitutional:  Patient denies fever or chills.  Skin:  Patient denies rashes, dermatologic changes.  Eyes:  Patient denies change in vision.  Ears, Nose, Throat:  Patient denies ear pain, ear discharge, tinnitus.  Denies sinus congestion, sinus pain, sore throat.  Respiratory:  Patient denies pulmonary congestion, wheezing, shortness of breath, dyspnea on exertion, cough.  Cardiovascular:  Patient denies chest pain, orthopnea, claudication, cardiac arrhythmia.  Gastrointestinal:  Patient denies abdominal pain, nausea, vomiting, diarrhea, constipation, dyspepsia, dysphagia, reflux, loss of bowel control.  Genitourinary:  Patient denies dysuria, hematuria, urgency, hesitancy, nocturia, incontinence.  Musculoskeletal:  Patient denies cervical, thoracic or lumbar pain.  Neurologic:  Patient denies headache or numbness, tingling, focal weakness of face region, upper or  lower extremities.  Psychiatric:  Patient admits to history of anxiety.  Endocrine:  Patient denies polyuria or polydipsia.  Hematologic/ Lymphatic:  Patient denies bleeding, bruising tendencies, petechia.  Allergy/ Immunologic:  Patient denies recurrent infections or allergic symptoms.       OBJECTIVE:  Vital signs:    Visit Vitals  /80 (BP Location: LUE - Left upper extremity, Patient Position: Sitting, Cuff Size: Regular)   Pulse (!) 108   Temp 97.5 °F (36.4 °C)   Wt 83 kg   BMI 33.45 kg/m²     General:  A White female in no acute distress.  Alert, cooperative, interactive.  Head, Eyes, Ears, Nose, Throat:   Eyes- Extraocular movements intact, pupils equally round and reactive to light, no conjunctival pallor or scleral abnormality.  Ears- Auditory acuity grossly intact, canals patent without otitis externa or cerumen impaction.  Tympanic membranes without otitis media.  Nose- Nares negative.  Mouth/throat- Negative.  Lymphatics:  No posterior cervical, submandibular, supraclavicular, preauricular or axillary adenopathy.  Neck:  No evidence of jugular venous distention, carotid bruits, masses or thyromegaly.  Lungs:  Clear to auscultation.  No accessory muscle use.  Heart:  Regular rate and rhythm, no murmur. No edema noted.  Abdomen:  Soft, bowel sounds present in all 4 quadrants.  No hepatomegaly or splenomegaly noted.  No abdominal tenderness.  Neurologic:  Oriented x3.  Cranial nerves II-XII intact to observation.  No apraxia or ataxia observed.  Musculoskeletal:  There is normal flexor/ extensor tone in both upper and lower extremities.  Skin:  Warm and dry.  No rashes.    ASSESSMENT/PLAN:  Anxiety  Asthma  Hyperlipidemia    See Patient Instructions  Orders Placed This Encounter   • albuterol (Ventolin HFA) 108 (90 Base) MCG/ACT inhaler   • LORazepam (ATIVAN) 1 MG tablet       Refill above medication  Schedule CPE June 2021         Negative

## 2022-03-30 NOTE — ED PROVIDER NOTE - PATIENT PORTAL LINK FT
You can access the FollowMyHealth Patient Portal offered by [facility] by registering at the following website: [email or web address]. By joining [employer]’s FollowMyHealth portal, you will also be able to view your health information using other applications (apps) compatible with our system.

## 2022-03-30 NOTE — ED PROVIDER NOTE - ATTENDING CONTRIBUTION TO CARE
I (Chente) agree with above, I performed a history and physical. Counseled ar medical staff, physician assistant, and/or medical student on medical decision making as documented. Medical decisions and treatment interventions were made in real time during the patient encounter. Additionally and/or with the following exceptions: The patient presented to the ED with back pain, center back and radiates down left leg. no trauma, no saddle anesthesia, urinary retention, weakness, numbness. Given acetaminophen, ibuprofen, lidocaine patch and tizanidine for muscle relaxation. Patient had transient hypotension from tizanidine, which resolved, but had significant improvement of pain. complete blood count within normal limits, complete metabolic panel within normal limits, urinalysis pos leuk esterase but negative for urinary tract infection and no urinary symptoms. xray positive for degenerative changes and osteopenia. Given spine follow up for tomorrow. Return precautions reviewed. Patient verbalized understand of conditions for return and plan for follow up.

## 2022-03-31 ENCOUNTER — TRANSCRIPTION ENCOUNTER (OUTPATIENT)
Age: 67
End: 2022-03-31

## 2022-03-31 ENCOUNTER — APPOINTMENT (OUTPATIENT)
Dept: ORTHOPEDIC SURGERY | Facility: CLINIC | Age: 67
End: 2022-03-31

## 2022-03-31 LAB
CULTURE RESULTS: SIGNIFICANT CHANGE UP
SPECIMEN SOURCE: SIGNIFICANT CHANGE UP

## 2023-06-30 ENCOUNTER — EMERGENCY (EMERGENCY)
Facility: HOSPITAL | Age: 68
LOS: 1 days | Discharge: HOME | End: 2023-06-30
Attending: EMERGENCY MEDICINE | Admitting: EMERGENCY MEDICINE
Payer: COMMERCIAL

## 2023-06-30 VITALS
DIASTOLIC BLOOD PRESSURE: 77 MMHG | TEMPERATURE: 98 F | OXYGEN SATURATION: 100 % | SYSTOLIC BLOOD PRESSURE: 134 MMHG | RESPIRATION RATE: 17 BRPM | HEART RATE: 80 BPM

## 2023-06-30 LAB
ALBUMIN SERPL ELPH-MCNC: 4.4 G/DL — SIGNIFICANT CHANGE UP (ref 3.3–5)
ALP SERPL-CCNC: 74 U/L — SIGNIFICANT CHANGE UP (ref 40–120)
ALT FLD-CCNC: 30 U/L — SIGNIFICANT CHANGE UP (ref 4–33)
ANION GAP SERPL CALC-SCNC: 14 MMOL/L — SIGNIFICANT CHANGE UP (ref 7–14)
APPEARANCE UR: CLEAR — SIGNIFICANT CHANGE UP
AST SERPL-CCNC: 31 U/L — SIGNIFICANT CHANGE UP (ref 4–32)
B PERT DNA SPEC QL NAA+PROBE: SIGNIFICANT CHANGE UP
B PERT+PARAPERT DNA PNL SPEC NAA+PROBE: SIGNIFICANT CHANGE UP
BACTERIA # UR AUTO: NEGATIVE — SIGNIFICANT CHANGE UP
BASOPHILS # BLD AUTO: 0 K/UL — SIGNIFICANT CHANGE UP (ref 0–0.2)
BASOPHILS NFR BLD AUTO: 0 % — SIGNIFICANT CHANGE UP (ref 0–2)
BILIRUB SERPL-MCNC: 0.3 MG/DL — SIGNIFICANT CHANGE UP (ref 0.2–1.2)
BILIRUB UR-MCNC: NEGATIVE — SIGNIFICANT CHANGE UP
BORDETELLA PARAPERTUSSIS (RAPRVP): SIGNIFICANT CHANGE UP
BUN SERPL-MCNC: 16 MG/DL — SIGNIFICANT CHANGE UP (ref 7–23)
C PNEUM DNA SPEC QL NAA+PROBE: SIGNIFICANT CHANGE UP
CALCIUM SERPL-MCNC: 9.8 MG/DL — SIGNIFICANT CHANGE UP (ref 8.4–10.5)
CHLORIDE SERPL-SCNC: 99 MMOL/L — SIGNIFICANT CHANGE UP (ref 98–107)
CO2 SERPL-SCNC: 26 MMOL/L — SIGNIFICANT CHANGE UP (ref 22–31)
COLOR SPEC: SIGNIFICANT CHANGE UP
CREAT SERPL-MCNC: 0.63 MG/DL — SIGNIFICANT CHANGE UP (ref 0.5–1.3)
DIFF PNL FLD: ABNORMAL
EGFR: 97 ML/MIN/1.73M2 — SIGNIFICANT CHANGE UP
EOSINOPHIL # BLD AUTO: 0 K/UL — SIGNIFICANT CHANGE UP (ref 0–0.5)
EOSINOPHIL NFR BLD AUTO: 0 % — SIGNIFICANT CHANGE UP (ref 0–6)
EPI CELLS # UR: 7 /HPF — HIGH (ref 0–5)
FLUAV SUBTYP SPEC NAA+PROBE: SIGNIFICANT CHANGE UP
FLUBV RNA SPEC QL NAA+PROBE: SIGNIFICANT CHANGE UP
GLUCOSE SERPL-MCNC: 106 MG/DL — HIGH (ref 70–99)
GLUCOSE UR QL: NEGATIVE — SIGNIFICANT CHANGE UP
HADV DNA SPEC QL NAA+PROBE: SIGNIFICANT CHANGE UP
HCOV 229E RNA SPEC QL NAA+PROBE: SIGNIFICANT CHANGE UP
HCOV HKU1 RNA SPEC QL NAA+PROBE: SIGNIFICANT CHANGE UP
HCOV NL63 RNA SPEC QL NAA+PROBE: SIGNIFICANT CHANGE UP
HCOV OC43 RNA SPEC QL NAA+PROBE: SIGNIFICANT CHANGE UP
HCT VFR BLD CALC: 36.4 % — SIGNIFICANT CHANGE UP (ref 34.5–45)
HGB BLD-MCNC: 11.4 G/DL — LOW (ref 11.5–15.5)
HIV 1+2 AB+HIV1 P24 AG SERPL QL IA: SIGNIFICANT CHANGE UP
HMPV RNA SPEC QL NAA+PROBE: SIGNIFICANT CHANGE UP
HPIV1 RNA SPEC QL NAA+PROBE: SIGNIFICANT CHANGE UP
HPIV2 RNA SPEC QL NAA+PROBE: SIGNIFICANT CHANGE UP
HPIV3 RNA SPEC QL NAA+PROBE: SIGNIFICANT CHANGE UP
HPIV4 RNA SPEC QL NAA+PROBE: SIGNIFICANT CHANGE UP
HYALINE CASTS # UR AUTO: 0 /LPF — SIGNIFICANT CHANGE UP (ref 0–7)
IANC: 11.24 K/UL — HIGH (ref 1.8–7.4)
KETONES UR-MCNC: ABNORMAL
LEUKOCYTE ESTERASE UR-ACNC: ABNORMAL
LYMPHOCYTES # BLD AUTO: 1.34 K/UL — SIGNIFICANT CHANGE UP (ref 1–3.3)
LYMPHOCYTES # BLD AUTO: 8.7 % — LOW (ref 13–44)
M PNEUMO DNA SPEC QL NAA+PROBE: SIGNIFICANT CHANGE UP
MCHC RBC-ENTMCNC: 22.1 PG — LOW (ref 27–34)
MCHC RBC-ENTMCNC: 31.3 GM/DL — LOW (ref 32–36)
MCV RBC AUTO: 70.4 FL — LOW (ref 80–100)
MONOCYTES # BLD AUTO: 0.66 K/UL — SIGNIFICANT CHANGE UP (ref 0–0.9)
MONOCYTES NFR BLD AUTO: 4.3 % — SIGNIFICANT CHANGE UP (ref 2–14)
NEUTROPHILS # BLD AUTO: 12.45 K/UL — HIGH (ref 1.8–7.4)
NEUTROPHILS NFR BLD AUTO: 78.3 % — HIGH (ref 43–77)
NITRITE UR-MCNC: NEGATIVE — SIGNIFICANT CHANGE UP
PH UR: 6.5 — SIGNIFICANT CHANGE UP (ref 5–8)
PLATELET # BLD AUTO: 256 K/UL — SIGNIFICANT CHANGE UP (ref 150–400)
POTASSIUM SERPL-MCNC: 3.4 MMOL/L — LOW (ref 3.5–5.3)
POTASSIUM SERPL-SCNC: 3.4 MMOL/L — LOW (ref 3.5–5.3)
PROT SERPL-MCNC: 7.7 G/DL — SIGNIFICANT CHANGE UP (ref 6–8.3)
PROT UR-MCNC: ABNORMAL
RAPID RVP RESULT: SIGNIFICANT CHANGE UP
RBC # BLD: 5.17 M/UL — SIGNIFICANT CHANGE UP (ref 3.8–5.2)
RBC # FLD: 16.5 % — HIGH (ref 10.3–14.5)
RBC CASTS # UR COMP ASSIST: 2 /HPF — SIGNIFICANT CHANGE UP (ref 0–4)
RSV RNA SPEC QL NAA+PROBE: SIGNIFICANT CHANGE UP
RV+EV RNA SPEC QL NAA+PROBE: SIGNIFICANT CHANGE UP
SARS-COV-2 RNA SPEC QL NAA+PROBE: SIGNIFICANT CHANGE UP
SODIUM SERPL-SCNC: 139 MMOL/L — SIGNIFICANT CHANGE UP (ref 135–145)
SP GR SPEC: 1.02 — SIGNIFICANT CHANGE UP (ref 1.01–1.05)
UROBILINOGEN FLD QL: SIGNIFICANT CHANGE UP
WBC # BLD: 15.39 K/UL — HIGH (ref 3.8–10.5)
WBC # FLD AUTO: 15.39 K/UL — HIGH (ref 3.8–10.5)
WBC UR QL: 26 /HPF — HIGH (ref 0–5)

## 2023-06-30 PROCEDURE — 99284 EMERGENCY DEPT VISIT MOD MDM: CPT

## 2023-06-30 PROCEDURE — 71045 X-RAY EXAM CHEST 1 VIEW: CPT | Mod: 26

## 2023-06-30 PROCEDURE — 93010 ELECTROCARDIOGRAM REPORT: CPT

## 2023-06-30 RX ORDER — ONDANSETRON 8 MG/1
4 TABLET, FILM COATED ORAL ONCE
Refills: 0 | Status: COMPLETED | OUTPATIENT
Start: 2023-06-30 | End: 2023-06-30

## 2023-06-30 RX ORDER — ACETAMINOPHEN 500 MG
1000 TABLET ORAL ONCE
Refills: 0 | Status: COMPLETED | OUTPATIENT
Start: 2023-06-30 | End: 2023-06-30

## 2023-06-30 RX ORDER — CEFPODOXIME PROXETIL 100 MG
200 TABLET ORAL ONCE
Refills: 0 | Status: COMPLETED | OUTPATIENT
Start: 2023-06-30 | End: 2023-06-30

## 2023-06-30 RX ORDER — CEFPODOXIME PROXETIL 100 MG
1 TABLET ORAL
Qty: 14 | Refills: 0
Start: 2023-06-30 | End: 2023-07-06

## 2023-06-30 RX ORDER — SODIUM CHLORIDE 9 MG/ML
1000 INJECTION INTRAMUSCULAR; INTRAVENOUS; SUBCUTANEOUS ONCE
Refills: 0 | Status: COMPLETED | OUTPATIENT
Start: 2023-06-30 | End: 2023-06-30

## 2023-06-30 RX ADMIN — ONDANSETRON 4 MILLIGRAM(S): 8 TABLET, FILM COATED ORAL at 09:34

## 2023-06-30 RX ADMIN — SODIUM CHLORIDE 1000 MILLILITER(S): 9 INJECTION INTRAMUSCULAR; INTRAVENOUS; SUBCUTANEOUS at 09:35

## 2023-06-30 RX ADMIN — Medication 400 MILLIGRAM(S): at 09:34

## 2023-06-30 RX ADMIN — Medication 200 MILLIGRAM(S): at 14:27

## 2023-06-30 NOTE — ED PROVIDER NOTE - OBJECTIVE STATEMENT
69 y/o female PMHx of HTN, HLD 69 y/o female PMHx of HTN, HLD Presenting with malaise associated with night splints and subjective fevers for 3 days.  Patient states symptoms started and have been going on and off for 1 month.  States that 2 weeks ago she was evaluated at urgent care for sore throat and fevers and was placed on antibiotics.  States symptoms resolved however past few days she has increased weakness, nausea however no vomiting or diarrhea.  Endorsing 5 pound weight loss in 2 weeks.  Denies any recent travel or sick contacts.  Denies any sexual activity.  States she has been taking Tylenol over-the-counter for symptoms 67 y/o female PMHx of HTN, HLD Presenting with malaise associated with night sweats and subjective fevers for 3 days.  Patient states symptoms started and have been going on and off for 1 month.  States that 2 weeks ago she was evaluated at urgent care for sore throat and fevers and was placed on antibiotics.  States symptoms resolved however past few days she has increased weakness, nausea however no vomiting or diarrhea.  Endorsing 5 pound weight loss in 2 weeks.  Denies any recent travel or sick contacts.  Denies any sexual activity.  States she has been taking Tylenol over-the-counter for symptoms

## 2023-06-30 NOTE — ED PROVIDER NOTE - PHYSICAL EXAMINATION
Benny Garces DO (PGY2)   Physical Exam:    Gen: NAD, AOx3  Head: NCAT  HEENT: EOMI, PEERLA  Lung: CTAB, no respiratory distress, no wheezes/rhonchi/rales B/L  CV: RRR, no murmurs, rubs or gallops  Abd: soft, NT, ND, no guarding, no rigidity, no rebound tenderness, no CVA tenderness   MSK: no visible deformities, ROM normal in UE/LE, no back pain  Neuro: No focal sensory or motor deficits  Skin: Warm, well perfused, no rash, no leg swelling

## 2023-06-30 NOTE — ED PROVIDER NOTE - CLINICAL SUMMARY MEDICAL DECISION MAKING FREE TEXT BOX
69 y/o female PMHx of HTN, HLD Presenting with malaise associated with night splints and subjective fevers for 3 days.  Patient states symptoms started and have been going on and off for 1 month.  States that 2 weeks ago she was evaluated at urgent care for sore throat and fevers and was placed on antibiotics.  States symptoms resolved however past few days she has increased weakness, nausea however no vomiting or diarrhea.  Endorsing 5 pound weight loss in 2 weeks.     Vital signs stable, afebrile, not hypoxic. Nonfocal PE.  Will order basic labs, CXR, RVP. Symptomatic control with fluids, zofran, Tylenol.   Differential diagnosis includes but not limited to viral syndrome vs. infectious etiology vs. electrolyte derangement

## 2023-06-30 NOTE — ED PROVIDER NOTE - ATTENDING CONTRIBUTION TO CARE
Dr. Carranza: This is a 68-year-old female with hypertension, hyperlipidemia, in the emergency department with 3 days of generalized fatigue and subjective fever.  Patient states that for approximately 1 month she has been having on and off similar symptoms, recently went to urgent care for sore throat and was given antibiotics, was feeling better but then had recurrence of some symptoms that brought her to the emergency department today.  She also endorses that she has lost 5 pounds over the last 2 weeks, not eating as much as usual.  On exam pt overall well appearing, in NAD, heart RRR, lungs CTAB, abd NTND, extremities without swelling, strength 5/5 in all extremities and skin without rash.  Oral cavity normal appearing and patent, teeth intact, uvula midline, tongue midline without elevation.  Tonsils are normal appearing and without exudates.  Speech normal.

## 2023-06-30 NOTE — ED PROVIDER NOTE - PROGRESS NOTE DETAILS
Benny Garces DO (PGY2)  patient UA showing +leuk esterase and WBC count. Although squamous cells present, patient with elevated WBC count. will give first dose abx and will d/c on PO abx. patient will f/u with PMD. Answered all questions for patient prior to d/c. discussed all labwork and results with patient.

## 2023-06-30 NOTE — ED PROVIDER NOTE - NSFOLLOWUPINSTRUCTIONS_ED_ALL_ED_FT
You have a viral syndrome. This can last from 5-10 days. Alternate Tylenol and Motrin every 4-6 hours for fever control as well as body aches and chills. Drink plenty of fluids. Rest. Return to the emergency room for worsening condition or new concerning symptoms. Follow up with your regular doctor. If you don't have a regular doctor use one of the numbers below to establish a primary care doctor.  A viral infection can be caused by different types of viruses. Most viral infections are not serious and resolve on their own. However, some infections may cause severe symptoms and may lead to further complications.SYMPTOMSViruses can frequently cause:Minor sore throat. Aches and pains. Headaches. Runny nose. Different types of rashes. Watery eyes. Tiredness. Cough. Loss of appetite. Gastrointestinal infections, resulting in nausea, vomiting, and diarrhea. These symptoms do not respond to antibiotics because the infection is not caused by bacteria. However, you might catch a bacterial infection following the viral infection. This is sometimes called a "superinfection." Symptoms of such a bacterial infection may include:Worsening sore throat with pus and difficulty swallowing. Swollen neck glands. Chills and a high or persistent fever. Severe headache. Tenderness over the sinuses. Persistent overall ill feeling (malaise), muscle aches, and tiredness (fatigue). Persistent cough. Yellow, green, or brown mucus production with coughing. HOME CARE INSTRUCTIONS Only take over-the-counter or prescription medicines for pain, discomfort, diarrhea, or fever as directed by your caregiver. Drink enough water and fluids to keep your urine clear or pale yellow. Sports drinks can provide valuable electrolytes, sugars, and hydration. Get plenty of rest and maintain proper nutrition. Soups and broths with crackers or rice are fine. SEEK IMMEDIATE MEDICAL CARE IF: You have severe headaches, shortness of breath, chest pain, neck pain, or an unusual rash. You have uncontrolled vomiting, diarrhea, or you are unable to keep down fluids.    Urinary Tract Infection    A urinary tract infection (UTI) is an infection of any part of the urinary tract, which includes the kidneys, ureters, bladder, and urethra. Risk factors include ignoring your need to urinate, wiping back to front if female, being an uncircumcised male, and having diabetes or a weak immune system. Symptoms include frequent urination, pain or burning with urination, foul smelling urine, cloudy urine, pain in the lower abdomen, blood in the urine, and fever. If you were prescribed an antibiotic medicine, take it as told by your health care provider. Do not stop taking the antibiotic even if you start to feel better.    SEEK IMMEDIATE MEDICAL CARE IF YOU HAVE ANY OF THE FOLLOWING SYMPTOMS: severe back or abdominal pain, fever, inability to keep fluids or medicine down, dizziness/lightheadedness, or a change in mental status.

## 2023-06-30 NOTE — ED ADULT TRIAGE NOTE - CHIEF COMPLAINT QUOTE
Pt c/o flu like symptoms an dizziness that she has been experiencing on and off for the past month. Was seen at urgent care and received antibiotics. Pt states she took two Tylenol this morning at 2 am. Pt endorsing a 5lb weight loss over the last three weeks. Denies chest pain, headache, SOB. Hx HTN, HLD.

## 2023-06-30 NOTE — ED PROVIDER NOTE - NSICDXPASTMEDICALHX_GEN_ALL_CORE_FT
PAST MEDICAL HISTORY:  No pertinent past medical history      PAST MEDICAL HISTORY:  HLD (hyperlipidemia)     HTN (hypertension)

## 2023-06-30 NOTE — ED PROVIDER NOTE - PATIENT PORTAL LINK FT
You can access the FollowMyHealth Patient Portal offered by [facility] by registering at the following website: [email or web address]. By joining [employer]’s FollowMyHealth portal, you will also be able to view your health information using other applications (apps) compatible with our system.

## 2023-06-30 NOTE — ED ADULT NURSE NOTE - OBJECTIVE STATEMENT
pt received in rm 6 AAO x 3. pt c/o "flu-like symptoms" every 2 weeks. pt denies sob, chest pain, cough. RR even and unlabored. 20g iv placed to right ac. will continue to monitor.

## 2023-07-02 LAB
CULTURE RESULTS: SIGNIFICANT CHANGE UP
SPECIMEN SOURCE: SIGNIFICANT CHANGE UP

## 2025-06-21 ENCOUNTER — EMERGENCY (EMERGENCY)
Facility: HOSPITAL | Age: 70
LOS: 1 days | End: 2025-06-21
Admitting: EMERGENCY MEDICINE
Payer: MEDICARE

## 2025-06-21 VITALS
OXYGEN SATURATION: 99 % | TEMPERATURE: 98 F | HEIGHT: 60 IN | RESPIRATION RATE: 16 BRPM | DIASTOLIC BLOOD PRESSURE: 83 MMHG | HEART RATE: 785 BPM | SYSTOLIC BLOOD PRESSURE: 150 MMHG | WEIGHT: 139.99 LBS

## 2025-06-21 PROBLEM — E78.5 HYPERLIPIDEMIA, UNSPECIFIED: Chronic | Status: ACTIVE | Noted: 2023-06-30

## 2025-06-21 PROBLEM — I10 ESSENTIAL (PRIMARY) HYPERTENSION: Chronic | Status: ACTIVE | Noted: 2023-06-30

## 2025-06-21 PROCEDURE — 99284 EMERGENCY DEPT VISIT MOD MDM: CPT

## 2025-06-21 NOTE — ED ADULT NURSE NOTE - PAIN: PRESENCE, MLM
Chronic problem  Intially meds held in setting of chock  Plan  Will consider CCB initiation, amlodipine     complains of pain/discomfort

## 2025-06-21 NOTE — ED ADULT NURSE NOTE - CHIEF COMPLAINT QUOTE
ambulatory, c/o atraumatic back pain x 6 months. has seen PCP, prescribed ibuprofen 600mg, gabapentin 100mg, meloxicam 7.5mg , cyclobenzaprine 10mg, tylenol 1000mg without relief; no imaging has been done. PCP on vacation prompting ED visit today. hx: HTN HLD

## 2025-06-21 NOTE — ED PROVIDER NOTE - CLINICAL SUMMARY MEDICAL DECISION MAKING FREE TEXT BOX
70-year-old female with past medical history of hypertension, hyperlipidemia presents to ED complaining of left upper back pain for 5 months.  Atraumatic no injury.  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  Has been following up with her primary care provider who prescribed her the medications which helped although pain returns.  Pain is not going away and called her doctor however on vacation so came to ER.  Was told the neck step would be an MRI.  Has not been in physical therapy yet.  Patient has been taking meloxicam, ibuprofen, Tylenol, Flexeril, gabapentin which helps.  Patient is ambulatory.  No falls.  States she has left upper back numbness at times when the pain gets bad. on exam no midline spinal ttp no neuro deficits. concern for muscle spasm, possible herniated disc. plan to check shoulder XR, spine XR, however pt deffering xray and prefers to f/u outpt for MRI. pt understands plan. given return precautions. amenable for dc home. c/w home regime that helps.

## 2025-06-21 NOTE — ED PROVIDER NOTE - NSFOLLOWUPINSTRUCTIONS_ED_ALL_ED_FT
Follow with your PMD within 48-72 hours.  Rest, no heavy lifting.  Warm compresses to area. Recommend Ortho consult to discuss possible MRI vs Physical Therapy- referral list provided.  Light walking.  Continue your home medications. You may also use Salonpas pain patches over the counter as needed for pain. Any worsening pain, weakness, numbness, bowel or urinary incontinence or new concerning symptoms return to the Emergency Department.

## 2025-06-21 NOTE — ED ADULT TRIAGE NOTE - CHIEF COMPLAINT QUOTE
ambulatory, c/o back pain x 6 months. has seen PCP, prescribed ibuprofen 600mg, gabapentin 100mg, meloxicam 7.5mg , cyclobenzaprine 10mg, tylenol 1000mg without relief; no imaging has been done. PCP on vacation prompting ED visit today. hx: HTN HLD ambulatory, c/o atraumatic back pain x 6 months. has seen PCP, prescribed ibuprofen 600mg, gabapentin 100mg, meloxicam 7.5mg , cyclobenzaprine 10mg, tylenol 1000mg without relief; no imaging has been done. PCP on vacation prompting ED visit today. hx: HTN HLD

## 2025-06-21 NOTE — ED PROVIDER NOTE - OBJECTIVE STATEMENT
70-year-old female with past medical history of hypertension, hyperlipidemia presents to ED complaining of left upper back pain for 5 months.  Atraumatic no injury.  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  Has been following up with her primary care provider who prescribed her the medications which helped although pain returns.  Pain is not going away and called her doctor however on vacation.  Was told the neck step would be an MRI.  Has not been in physical therapy yet.  Patient has been taking meloxicam, ibuprofen, Tylenol, Flexeril, gabapentin which helps.  Patient is ambulatory.  No falls.  States she has left upper back numbness at times when the pain gets bad.  No weakness in the arms or legs numbness tingling chest pain shortness of breath dizziness fevers chills weight loss.

## 2025-06-21 NOTE — ED PROVIDER NOTE - MUSCULOSKELETAL NECK EXAM
no midline spinal ttp. left paraspinal thoracic muscle TTP spasm/no deformity, pain or tenderness. no restriction of movement

## 2025-06-21 NOTE — ED PROVIDER NOTE - PATIENT PORTAL LINK FT
You can access the FollowMyHealth Patient Portal offered by [facility] by registering at the following website: [email or web address]. By joining [employer]’s FollowMyHealth portal, you will also be able to view your health information using other applications (apps) compatible with our system.

## 2025-06-21 NOTE — ED ADULT NURSE NOTE - OBJECTIVE STATEMENT
Pt received to intake room 12. Pt is a 70 year old female with Hx of HTN, HLD. Pt c/o atraumatic back pain x 6 months. Pt was evaluated and prescribed meds from PCP, however presents to ED today because pain persists and PCP is unavailable. Pt is A&Ox4, ambulatory with steady gait observed. Pt evaluated and discharged by ACP.

## 2025-07-02 ENCOUNTER — APPOINTMENT (OUTPATIENT)
Dept: ORTHOPEDIC SURGERY | Facility: CLINIC | Age: 70
End: 2025-07-02